# Patient Record
Sex: FEMALE | Race: WHITE | NOT HISPANIC OR LATINO | URBAN - METROPOLITAN AREA
[De-identification: names, ages, dates, MRNs, and addresses within clinical notes are randomized per-mention and may not be internally consistent; named-entity substitution may affect disease eponyms.]

---

## 2017-11-20 ENCOUNTER — ANESTHESIA EVENT (OUTPATIENT)
Dept: SURGERY | Facility: CLINIC | Age: 67
End: 2017-11-20
Payer: MEDICARE

## 2017-11-20 ENCOUNTER — HOSPITAL ENCOUNTER (OUTPATIENT)
Facility: CLINIC | Age: 67
Discharge: HOME | End: 2017-11-20
Attending: OPHTHALMOLOGY | Admitting: OPHTHALMOLOGY
Payer: MEDICARE

## 2017-11-20 ENCOUNTER — ANESTHESIA (OUTPATIENT)
Dept: SURGERY | Facility: CLINIC | Age: 67
End: 2017-11-20
Payer: MEDICARE

## 2017-11-20 VITALS
HEIGHT: 62 IN | TEMPERATURE: 98.7 F | OXYGEN SATURATION: 98 % | RESPIRATION RATE: 16 BRPM | DIASTOLIC BLOOD PRESSURE: 74 MMHG | SYSTOLIC BLOOD PRESSURE: 137 MMHG | WEIGHT: 190 LBS | BODY MASS INDEX: 34.96 KG/M2

## 2017-11-20 DIAGNOSIS — H25.019 CORTICAL AGE-RELATED CATARACT, UNSPECIFIED LATERALITY: Primary | ICD-10-CM

## 2017-11-20 PROCEDURE — 25000128 H RX IP 250 OP 636: Performed by: OPHTHALMOLOGY

## 2017-11-20 PROCEDURE — 71000028 ZZH EYE RECOVERY PHASE 2 EACH 15 MINS: Performed by: OPHTHALMOLOGY

## 2017-11-20 PROCEDURE — 37000008 ZZH ANESTHESIA TECHNICAL FEE, 1ST 30 MIN: Performed by: OPHTHALMOLOGY

## 2017-11-20 PROCEDURE — V2788 PRESBYOPIA-CORRECT FUNCTION: HCPCS | Performed by: OPHTHALMOLOGY

## 2017-11-20 PROCEDURE — 25000128 H RX IP 250 OP 636: Performed by: ANESTHESIOLOGY

## 2017-11-20 PROCEDURE — 25000128 H RX IP 250 OP 636: Performed by: NURSE ANESTHETIST, CERTIFIED REGISTERED

## 2017-11-20 PROCEDURE — 40000170 ZZH STATISTIC PRE-PROCEDURE ASSESSMENT II: Performed by: OPHTHALMOLOGY

## 2017-11-20 PROCEDURE — 36000101 ZZH EYE SURGERY LEVEL 3 1ST 30 MIN: Performed by: OPHTHALMOLOGY

## 2017-11-20 PROCEDURE — 25000125 ZZHC RX 250: Performed by: ANESTHESIOLOGY

## 2017-11-20 PROCEDURE — 25000125 ZZHC RX 250: Performed by: OPHTHALMOLOGY

## 2017-11-20 PROCEDURE — V2632 POST CHMBR INTRAOCULAR LENS: HCPCS | Performed by: OPHTHALMOLOGY

## 2017-11-20 PROCEDURE — 27210794 ZZH OR GENERAL SUPPLY STERILE: Performed by: OPHTHALMOLOGY

## 2017-11-20 DEVICE — IMPLANTABLE DEVICE: Type: IMPLANTABLE DEVICE | Site: EYE | Status: FUNCTIONAL

## 2017-11-20 RX ORDER — SODIUM CHLORIDE, SODIUM LACTATE, POTASSIUM CHLORIDE, CALCIUM CHLORIDE 600; 310; 30; 20 MG/100ML; MG/100ML; MG/100ML; MG/100ML
500 INJECTION, SOLUTION INTRAVENOUS CONTINUOUS
Status: DISCONTINUED | OUTPATIENT
Start: 2017-11-20 | End: 2017-11-20 | Stop reason: HOSPADM

## 2017-11-20 RX ORDER — MOXIFLOXACIN 5 MG/ML
1 SOLUTION/ DROPS OPHTHALMIC
Status: COMPLETED | OUTPATIENT
Start: 2017-11-20 | End: 2017-11-20

## 2017-11-20 RX ORDER — PHENYLEPHRINE HYDROCHLORIDE 25 MG/ML
1 SOLUTION/ DROPS OPHTHALMIC
Status: COMPLETED | OUTPATIENT
Start: 2017-11-20 | End: 2017-11-20

## 2017-11-20 RX ORDER — PROPARACAINE HYDROCHLORIDE 5 MG/ML
1 SOLUTION/ DROPS OPHTHALMIC ONCE
Status: COMPLETED | OUTPATIENT
Start: 2017-11-20 | End: 2017-11-20

## 2017-11-20 RX ORDER — TROPICAMIDE 10 MG/ML
1 SOLUTION/ DROPS OPHTHALMIC
Status: COMPLETED | OUTPATIENT
Start: 2017-11-20 | End: 2017-11-20

## 2017-11-20 RX ORDER — DICLOFENAC SODIUM 1 MG/ML
1 SOLUTION/ DROPS OPHTHALMIC
Status: COMPLETED | OUTPATIENT
Start: 2017-11-20 | End: 2017-11-20

## 2017-11-20 RX ORDER — LIDOCAINE HYDROCHLORIDE 10 MG/ML
INJECTION, SOLUTION EPIDURAL; INFILTRATION; INTRACAUDAL; PERINEURAL PRN
Status: DISCONTINUED | OUTPATIENT
Start: 2017-11-20 | End: 2017-11-20 | Stop reason: HOSPADM

## 2017-11-20 RX ORDER — BALANCED SALT SOLUTION 6.4; .75; .48; .3; 3.9; 1.7 MG/ML; MG/ML; MG/ML; MG/ML; MG/ML; MG/ML
SOLUTION OPHTHALMIC PRN
Status: DISCONTINUED | OUTPATIENT
Start: 2017-11-20 | End: 2017-11-20 | Stop reason: HOSPADM

## 2017-11-20 RX ADMIN — TROPICAMIDE 1 DROP: 10 SOLUTION/ DROPS OPHTHALMIC at 09:44

## 2017-11-20 RX ADMIN — TROPICAMIDE 1 DROP: 10 SOLUTION/ DROPS OPHTHALMIC at 09:37

## 2017-11-20 RX ADMIN — DICLOFENAC SODIUM 1 DROP: 1 SOLUTION OPHTHALMIC at 09:44

## 2017-11-20 RX ADMIN — MOXIFLOXACIN 1 DROP: 5 SOLUTION/ DROPS OPHTHALMIC at 09:37

## 2017-11-20 RX ADMIN — DICLOFENAC SODIUM 1 DROP: 1 SOLUTION OPHTHALMIC at 09:38

## 2017-11-20 RX ADMIN — Medication 1 DROP: at 09:46

## 2017-11-20 RX ADMIN — TROPICAMIDE 1 DROP: 10 SOLUTION/ DROPS OPHTHALMIC at 09:29

## 2017-11-20 RX ADMIN — PHENYLEPHRINE HYDROCHLORIDE 1 DROP: 2.5 SOLUTION/ DROPS OPHTHALMIC at 09:44

## 2017-11-20 RX ADMIN — DICLOFENAC SODIUM 1 DROP: 1 SOLUTION OPHTHALMIC at 09:29

## 2017-11-20 RX ADMIN — SODIUM CHLORIDE, POTASSIUM CHLORIDE, SODIUM LACTATE AND CALCIUM CHLORIDE 500 ML: 600; 310; 30; 20 INJECTION, SOLUTION INTRAVENOUS at 09:48

## 2017-11-20 RX ADMIN — MOXIFLOXACIN 1 DROP: 5 SOLUTION/ DROPS OPHTHALMIC at 09:29

## 2017-11-20 RX ADMIN — MOXIFLOXACIN 1 DROP: 5 SOLUTION/ DROPS OPHTHALMIC at 09:44

## 2017-11-20 RX ADMIN — LIDOCAINE HYDROCHLORIDE 1 ML: 10 INJECTION, SOLUTION EPIDURAL; INFILTRATION; INTRACAUDAL; PERINEURAL at 09:46

## 2017-11-20 RX ADMIN — PROPARACAINE HYDROCHLORIDE 1 DROP: 5 SOLUTION/ DROPS OPHTHALMIC at 09:28

## 2017-11-20 RX ADMIN — MIDAZOLAM HYDROCHLORIDE 0.5 MG: 1 INJECTION, SOLUTION INTRAMUSCULAR; INTRAVENOUS at 10:28

## 2017-11-20 RX ADMIN — PHENYLEPHRINE HYDROCHLORIDE 1 DROP: 2.5 SOLUTION/ DROPS OPHTHALMIC at 09:37

## 2017-11-20 RX ADMIN — PHENYLEPHRINE HYDROCHLORIDE 1 DROP: 2.5 SOLUTION/ DROPS OPHTHALMIC at 09:29

## 2017-11-20 ASSESSMENT — ENCOUNTER SYMPTOMS
DYSRHYTHMIAS: 0
ORTHOPNEA: 0
SEIZURES: 0

## 2017-11-20 ASSESSMENT — COPD QUESTIONNAIRES: COPD: 0

## 2017-11-20 ASSESSMENT — LIFESTYLE VARIABLES: TOBACCO_USE: 0

## 2017-11-20 NOTE — ANESTHESIA PREPROCEDURE EVALUATION
Procedure: Procedure(s):  PHACOEMULSIFICATION CLEAR CORNEA WITH DELUXE INTRAOCULAR LENS IMPLANT  INCISION LIMBAL RELAXING, KERATOTOMY ARCUATE  Preop diagnosis: CATARACT    Allergies   Allergen Reactions     Codeine      Hmg-Coa-R Inhibitors      History reviewed. No pertinent past medical history.  Past Surgical History:   Procedure Laterality Date     ABDOMEN SURGERY       BIOPSY       COLONOSCOPY       HYSTERECTOMY       Social History   Substance Use Topics     Smoking status: Never Smoker     Smokeless tobacco: Never Used     Alcohol use Yes      Comment: social     Prior to Admission medications    Medication Sig Start Date End Date Taking? Authorizing Provider   Calcium Carbonate-Vit D-Min (CALCIUM 1200 PO)    Yes Reported, Patient   VITAMIN D, CHOLECALCIFEROL, PO Take 2,000 Units by mouth daily   Yes Reported, Patient     Current Facility-Administered Medications Ordered in Epic   Medication Dose Route Frequency Last Rate Last Dose     lidocaine 1 % 1 mL  1 mL Other Q1H PRN   1 mL at 11/20/17 0946     lactated ringers infusion  500 mL Intravenous Continuous 25 mL/hr at 11/20/17 0948 500 mL at 11/20/17 0948     No current [facility]-ordered outpatient prescriptions on file.       lactated ringers 500 mL (11/20/17 0948)     Wt Readings from Last 1 Encounters:   11/20/17 86.2 kg (190 lb)     Temp Readings from Last 1 Encounters:   11/20/17 37.1  C (98.7  F) (Temporal)     BP Readings from Last 6 Encounters:   11/20/17 132/74     Pulse Readings from Last 4 Encounters:   No data found for Pulse     Resp Readings from Last 1 Encounters:   11/20/17 16     SpO2 Readings from Last 1 Encounters:   11/20/17 99%     RECENT LABS:   ECG:   ECHO:     Anesthesia Evaluation     . Pt has had prior anesthetic. Type: General    No history of anesthetic complications (very sensitive to anesthetics)          ROS/MED HX    ENT/Pulmonary:      (-) tobacco use, asthma, COPD, sleep apnea and recent URI   Neurologic:      (-) seizures and  CVA   Cardiovascular:        (-) angina, hypertension, CAD, orthopnea/PND, syncope, arrhythmias, irregular heartbeat/palpitations, valvular problems/murmurs and angina   METS/Exercise Tolerance:  >4 METS   Hematologic:         Musculoskeletal:         GI/Hepatic:        (-) GERD and liver disease   Renal/Genitourinary:      (-) renal disease   Endo:      (-) Type II DM and thyroid disease   Psychiatric:         Infectious Disease:         Malignancy:         Other:                     Physical Exam      Airway   Mallampati: II  TM distance: >3 FB  Neck ROM: full    Dental   (+) caps    Cardiovascular   Rhythm and rate: regular and normal  (-) no murmur    Pulmonary    breath sounds clear to auscultation(-) no wheezes                    Anesthesia Plan      History & Physical Review  History and physical reviewed and following examination; no interval change.    ASA Status:  1 .    NPO Status:  > 8 hours    Plan for MAC Reason for MAC:  Procedure to face, neck, head or breast  PONV prophylaxis:  Ondansetron (or other 5HT-3)  Light sedation, slow titration       Postoperative Care  Postoperative pain management:  Multi-modal analgesia.      Consents  Anesthetic plan, risks, benefits and alternatives discussed with:  Patient..

## 2017-11-20 NOTE — DISCHARGE INSTRUCTIONS
[facility] Anesthesia [facility] Discharge  Instructions  Anesthesia (Eye Care Center)   Adult Discharge Instructions    For 24 hours after surgery    1. Get plenty of rest.  Make arrangements to have a responsible adult stay with you for at least 6 hours after you leave the hospital.  2. Do not drive or use heavy equipment for 24 hours.    3. Do not drink alcohol for 24 hours.  4. Do not sign legal documents or make important decisions for 24 hours.  5. Avoid strenuous or risky activities. You may feel lightheaded.  If so, sit for a few minutes before standing.  Have someone help you get up.   6. Conscious sedation patients may resume a regular diet..  7. Any questions of medical nature, call your physician.    Dr. Mervin Moyer  [facility]  [phone]  Post Operative Cataract Instructions        You may remove the eye shield on arrival home and begin eye drops as directed when you get home.      Wear eye shield when sleeping for protection for 5 days.      Do not rub the operated eye.      Light sensitivity may be noticed. Sunglasses may be worn for comfort.      Some discomfort and irritation may be noticed. Acetaminophen (Tylenol) or Ibuprofen (Advil) may be taken for discomfort. If pain persists please call Dr. Moyer's office.      Keep the operated eye dry. You may wash your hair, bathe or shower, but keep the operated eye closed while doing so.       Use medication exactly as prescribed by your doctor. You may restart your regular home medications.        No eye makeup on or around the eye for 1 week.      Bring any glaucoma medications with you to the clinic on your first post operative visit.      Call Dr. Moyer's office if any of the following should occur:    o Any sudden vision changes  o Nausea or severe headache  o Increase in pain not controlled  o Or signs of infection (pus, increasing redness or tenderness)

## 2017-11-20 NOTE — ANESTHESIA POSTPROCEDURE EVALUATION
Patient: Oliva Nj    Procedure(s):  LEFT EYE PHACOEMULSIFICATION CLEAR CORNEA WITH MULTI FOCAL TECHNIS DELUXE INTRAOCULAR LENS IMPLANT AND LIMBAL  RELAXING INCISION - Wound Class: I-Clean      Diagnosis:CATARACT  Diagnosis Additional Information: No value filed.    Anesthesia Type:  MAC    Note:  Anesthesia Post Evaluation    Patient location during evaluation: PACU  Patient participation: Able to fully participate in evaluation  Level of consciousness: awake  Pain management: adequate  Airway patency: patent  Cardiovascular status: acceptable  Respiratory status: acceptable  Hydration status: acceptable  PONV: none     Anesthetic complications: None          Last vitals:  Vitals:    11/20/17 0933 11/20/17 1058 11/20/17 1104   BP: 132/74 129/70 137/74   Resp: 16 16 16   Temp: 37.1  C (98.7  F)     SpO2: 99% 97% 98%         Electronically Signed By: Jean Cartagena MD  November 20, 2017  12:36 PM

## 2017-11-20 NOTE — IP AVS SNAPSHOT
MRN:[id number]                      After Visit Summary   11/20/2017    Oliva Nj    MRN: [id number]           Thank you!     Thank you for choosing [facility] for your care. Our goal is always to provide you with excellent care. Hearing back from our patients is one way we can continue to improve our services. Please take a few minutes to complete the written survey that you may receive in the mail after you visit with us. Thank you!        Patient Information     Date Of Birth          1950        About your hospital stay     You were admitted on:  November 20, 2017 You last received care in the:  [facility]    You were discharged on:  November 20, 2017       Who to Call     For medical emergencies, please call 911.  For non-urgent questions about your medical care, please call your primary care provider or clinic, None  For questions related to your surgery, please call your surgery clinic        Attending Provider     Provider Specialty    Mervin Moyer MD Ophthalmology       Primary Care Provider    Ashia Peralta      Your next 10 appointments already scheduled     Dec 11, 2017   Procedure with Mervin Moyer MD   [facility] PeriOP Services (--)    [address]  [facility] [address] [phone]              Further instructions from your care team       [facility] Anesthesia Eye Care Center Discharge  Instructions  Anesthesia ([facility] [place])   Adult Discharge Instructions    For 24 hours after surgery    1. Get plenty of rest.  Make arrangements to have a responsible adult stay with you for at least 6 hours after you leave the hospital.  2. Do not drive or use heavy equipment for 24 hours.    3. Do not drink alcohol for 24 hours.  4. Do not sign legal documents or make important decisions for 24 hours.  5. Avoid strenuous or risky activities. You may feel lightheaded.  If so, sit for a few minutes before standing.   "Have someone help you get up.   6. Conscious sedation patients may resume a regular diet..  7. Any questions of medical nature, call your physician.    Dr. Mervin Moyer  [facility]  [phone]  Post Operative Cataract Instructions        You may remove the eye shield on arrival home and begin eye drops as directed when you get home.      Wear eye shield when sleeping for protection for 5 days.      Do not rub the operated eye.      Light sensitivity may be noticed. Sunglasses may be worn for comfort.      Some discomfort and irritation may be noticed. Acetaminophen (Tylenol) or Ibuprofen (Advil) may be taken for discomfort. If pain persists please call Dr. Moyer's office.      Keep the operated eye dry. You may wash your hair, bathe or shower, but keep the operated eye closed while doing so.       Use medication exactly as prescribed by your doctor. You may restart your regular home medications.        No eye makeup on or around the eye for 1 week.      Bring any glaucoma medications with you to the clinic on your first post operative visit.      Call Dr. Moyer's office if any of the following should occur:    o Any sudden vision changes  o Nausea or severe headache  o Increase in pain not controlled  o Or signs of infection (pus, increasing redness or tenderness)    Pending Results     No orders found from 11/18/2017 to 11/21/2017.            Admission Information     Date & Time Provider Department Dept. Phone    11/20/2017 Mervin Moyer MD [facility] [phone]      Your Vitals Were     Blood Pressure Temperature Respirations Height Weight Pulse Oximetry    129/70 98.7  F (37.1  C) (Temporal) 16 1.575 m (5' 2\") 86.2 kg (190 lb) 97%    BMI (Body Mass Index)                   34.75 kg/m2           MyChart Information     The [employer] lets you send messages to your doctor, view your test results, renew your prescriptions, schedule appointments and more. To sign up, go to " "[email or web address] . Click on \"Log in\" on the left side of the screen, which will take you to the Welcome page. Then click on \"Sign up Now\" on the right side of the page.     You will be asked to enter the access code listed below, as well as some personal information. Please follow the directions to create your username and password.     Your access code is: [email or web address]  Expires: 2018 11:01 AM     Your access code will  in 90 days. If you need help or a new code, please call your [place] clinic or [phone].        Care EveryWhere ID     This is your Care EveryWhere ID. This could be used by other organizations to access your [place] medical records  [id number]        Equal Access to Services     TITO JARAMILLO : Odalys Tomas, randall goins, lily khan, mulugeta mendoza . So [facility] [phone].    ATENCIÓN: Si habla español, tiene a phan disposición servicios gratuitos de asistencia lingüística. Sarah al [phone].    We comply with applicable federal civil rights laws and [place] laws. We do not discriminate on the basis of race, color, national origin, age, disability, sex, sexual orientation, or gender identity.               Review of your medicines      UNREVIEWED medicines. Ask your doctor about these medicines        Dose / Directions    CALCIUM 1200 PO        Refills:  0       VITAMIN D (CHOLECALCIFEROL) PO        Dose:  2000 Units   Take 2,000 Units by mouth daily   Refills:  0                Protect others around you: Learn how to safely use, store and throw away your medicines at www.disposemymeds.org.             Medication List: This is a list of all your medications and when to take them. Check marks below indicate your daily home schedule. Keep this list as a reference.      Medications           Morning Afternoon Evening Bedtime As Needed    CALCIUM 1200 PO                                VITAMIN D (CHOLECALCIFEROL) PO "   Take 2,000 Units by mouth daily

## 2017-11-20 NOTE — IP AVS SNAPSHOT
[facility]    [address]    [place] [address]    Phone:  [phone]    Fax:  [phone]                                       After Visit Summary   11/20/2017    Oliva Nj    MRN: [id number]           After Visit Summary Signature Page     I have received my discharge instructions, and my questions have been answered. I have discussed any challenges I see with this plan with the nurse or doctor.    ..........................................................................................................................................  Patient/Patient Representative Signature      ..........................................................................................................................................  Patient Representative Print Name and Relationship to Patient    ..................................................               ................................................  Date                                            Time    ..........................................................................................................................................  Reviewed by Signature/Title    ...................................................              ..............................................  Date                                                            Time

## 2017-11-20 NOTE — ANESTHESIA CARE TRANSFER NOTE
Patient: Oliva Nj    Procedure(s):  LEFT EYE PHACOEMULSIFICATION CLEAR CORNEA WITH MULTI FOCAL TECHNIS DELUXE INTRAOCULAR LENS IMPLANT AND LIMBAL  RELAXING INCISION - Wound Class: I-Clean      Diagnosis: CATARACT  Diagnosis Additional Information: No value filed.    Anesthesia Type:   MAC     Note:  Airway :Room Air  Patient transferred to:PACU  Handoff Report: Identifed the Patient, Identified the Reponsible Provider, Reviewed the pertinent medical history, Discussed the surgical course, Reviewed Intra-OP anesthesia mangement and issues during anesthesia, Set expectations for post-procedure period and Allowed opportunity for questions and acknowledgement of understanding      Vitals: (Last set prior to Anesthesia Care Transfer)    CRNA VITALS  11/20/2017 1026 - 11/20/2017 1057      11/20/2017             Resp Rate (set): 10                Electronically Signed By: RAZIA Santana CRNA  November 20, 2017  10:57 AM

## 2017-11-26 NOTE — OP NOTE
PREOPERATIVE DIAGNOSIS: Visually significant cataract, Left  Left eye   POSTOPERATIVE DIAGNOSIS: Same   PROCEDURES:   1. Cataract extraction with intraocular lens implant Left  Left eye.  SURGEON: Mervin Moyer MD   INDICATIONS: The patient Oliva Nj presented to the eye clinic with decreased vision secondary to cataract in the Left  Left eye. The risks, benefits and alternatives to cataract extraction were discussed. The patient elected to proceed. All questions were answered to the patient's satisfaction.   DESCRIPTION OF PROCEDURE: The patient was brought to the operating suite and the Left  Left eye was prepped and draped in a sterile fashion.  A paracentesis was created using the Supersharp blade.  1% non-preserved Lidocaine was then injected into the anterior chamber, followed by viscoelastic. A temporal clear corneal wound was created using the keratome. The cystotome was introduced and an anterior capsulorrhexis begun which was completed using the Utrata forceps. Hydrodissection was performed, and the nucleus was rotated. The phacoemulsification handpiece was introduced, and the nucleus was grooved and subsequently cracked. The nucleus was rotated and chopped into smaller segments using the Marcia chopper. These segments were emulsified and aspirated from the eye. The remaining lens cortex was removed using irrigation-aspiration. The capsular bag was filled with viscoelastic, and an intraocular lens was injected into the capsular bag (see below). Remaining viscoelastic was removed using irrigation-aspiration. The anterior chamber was instilled with balanced salt solution and found to be watertight.  0.1cc Cefuroxime was then injected via the paracentesis.  Betadine followed by Durezol drops were applied to the ocular surface which was then covered with a shield. The patient was transferred to the Post Anesthesia Care Unit in satisfactory condition.   PLAN: The patient will be discharged to  home and will follow up tomorrow morning in the eye clinic.  EBL:  None  Complications:  None  Specimens:  None  Findings:  Cataract    Implant Name Type Inv. Item Serial No.  Lot No. LRB No. Used   EYE IMP IOL MARINA PCL TECNIS SYMFONY XR [id number] 27.0 Lens/Eye Implant EYE IMP IOL MARINA PCL TECNIS SYMFONY XR [id number] 27.0 [id number] [employer]   Left 1     CC:  Mervin Moyer MD - [facility]

## 2017-12-11 ENCOUNTER — HOSPITAL ENCOUNTER (OUTPATIENT)
Facility: CLINIC | Age: 67
Discharge: HOME | End: 2017-12-11
Attending: OPHTHALMOLOGY | Admitting: OPHTHALMOLOGY
Payer: MEDICARE

## 2017-12-11 ENCOUNTER — ANESTHESIA EVENT (OUTPATIENT)
Dept: SURGERY | Facility: CLINIC | Age: 67
End: 2017-12-11
Payer: MEDICARE

## 2017-12-11 ENCOUNTER — ANESTHESIA (OUTPATIENT)
Dept: SURGERY | Facility: CLINIC | Age: 67
End: 2017-12-11
Payer: MEDICARE

## 2017-12-11 VITALS
TEMPERATURE: 97.5 F | DIASTOLIC BLOOD PRESSURE: 67 MMHG | RESPIRATION RATE: 16 BRPM | OXYGEN SATURATION: 94 % | BODY MASS INDEX: 34.97 KG/M2 | WEIGHT: 190.04 LBS | HEIGHT: 62 IN | SYSTOLIC BLOOD PRESSURE: 115 MMHG

## 2017-12-11 DIAGNOSIS — H25.019 CORTICAL AGE-RELATED CATARACT, UNSPECIFIED LATERALITY: Primary | ICD-10-CM

## 2017-12-11 PROCEDURE — 25000128 H RX IP 250 OP 636: Performed by: NURSE ANESTHETIST, CERTIFIED REGISTERED

## 2017-12-11 PROCEDURE — 25000125 ZZHC RX 250: Performed by: OPHTHALMOLOGY

## 2017-12-11 PROCEDURE — 37000009 ZZH ANESTHESIA TECHNICAL FEE, EACH ADDTL 15 MIN: Performed by: OPHTHALMOLOGY

## 2017-12-11 PROCEDURE — 36000101 ZZH EYE SURGERY LEVEL 3 1ST 30 MIN: Performed by: OPHTHALMOLOGY

## 2017-12-11 PROCEDURE — 27210794 ZZH OR GENERAL SUPPLY STERILE: Performed by: OPHTHALMOLOGY

## 2017-12-11 PROCEDURE — 25000125 ZZHC RX 250: Performed by: ANESTHESIOLOGY

## 2017-12-11 PROCEDURE — 25000125 ZZHC RX 250: Performed by: NURSE ANESTHETIST, CERTIFIED REGISTERED

## 2017-12-11 PROCEDURE — 25000128 H RX IP 250 OP 636: Performed by: ANESTHESIOLOGY

## 2017-12-11 PROCEDURE — 37000008 ZZH ANESTHESIA TECHNICAL FEE, 1ST 30 MIN: Performed by: OPHTHALMOLOGY

## 2017-12-11 PROCEDURE — 40000170 ZZH STATISTIC PRE-PROCEDURE ASSESSMENT II: Performed by: OPHTHALMOLOGY

## 2017-12-11 PROCEDURE — 25000128 H RX IP 250 OP 636: Performed by: OPHTHALMOLOGY

## 2017-12-11 PROCEDURE — V2788 PRESBYOPIA-CORRECT FUNCTION: HCPCS | Performed by: OPHTHALMOLOGY

## 2017-12-11 PROCEDURE — V2632 POST CHMBR INTRAOCULAR LENS: HCPCS | Performed by: OPHTHALMOLOGY

## 2017-12-11 PROCEDURE — 71000028 ZZH EYE RECOVERY PHASE 2 EACH 15 MINS: Performed by: OPHTHALMOLOGY

## 2017-12-11 DEVICE — IMPLANTABLE DEVICE: Type: IMPLANTABLE DEVICE | Site: EYE | Status: FUNCTIONAL

## 2017-12-11 RX ORDER — DICLOFENAC SODIUM 1 MG/ML
1 SOLUTION/ DROPS OPHTHALMIC
Status: COMPLETED | OUTPATIENT
Start: 2017-12-11 | End: 2017-12-11

## 2017-12-11 RX ORDER — TROPICAMIDE 10 MG/ML
1 SOLUTION/ DROPS OPHTHALMIC
Status: COMPLETED | OUTPATIENT
Start: 2017-12-11 | End: 2017-12-11

## 2017-12-11 RX ORDER — PROPARACAINE HYDROCHLORIDE 5 MG/ML
1 SOLUTION/ DROPS OPHTHALMIC ONCE
Status: COMPLETED | OUTPATIENT
Start: 2017-12-11 | End: 2017-12-11

## 2017-12-11 RX ORDER — LIDOCAINE HYDROCHLORIDE 10 MG/ML
INJECTION, SOLUTION EPIDURAL; INFILTRATION; INTRACAUDAL; PERINEURAL PRN
Status: DISCONTINUED | OUTPATIENT
Start: 2017-12-11 | End: 2017-12-11 | Stop reason: HOSPADM

## 2017-12-11 RX ORDER — MOXIFLOXACIN 5 MG/ML
1 SOLUTION/ DROPS OPHTHALMIC
Status: COMPLETED | OUTPATIENT
Start: 2017-12-11 | End: 2017-12-11

## 2017-12-11 RX ORDER — BALANCED SALT SOLUTION 6.4; .75; .48; .3; 3.9; 1.7 MG/ML; MG/ML; MG/ML; MG/ML; MG/ML; MG/ML
SOLUTION OPHTHALMIC PRN
Status: DISCONTINUED | OUTPATIENT
Start: 2017-12-11 | End: 2017-12-11 | Stop reason: HOSPADM

## 2017-12-11 RX ORDER — SODIUM CHLORIDE, SODIUM LACTATE, POTASSIUM CHLORIDE, CALCIUM CHLORIDE 600; 310; 30; 20 MG/100ML; MG/100ML; MG/100ML; MG/100ML
500 INJECTION, SOLUTION INTRAVENOUS CONTINUOUS
Status: DISCONTINUED | OUTPATIENT
Start: 2017-12-11 | End: 2017-12-11 | Stop reason: HOSPADM

## 2017-12-11 RX ORDER — PHENYLEPHRINE HYDROCHLORIDE 25 MG/ML
1 SOLUTION/ DROPS OPHTHALMIC
Status: COMPLETED | OUTPATIENT
Start: 2017-12-11 | End: 2017-12-11

## 2017-12-11 RX ADMIN — DICLOFENAC SODIUM 1 DROP: 1 SOLUTION OPHTHALMIC at 06:53

## 2017-12-11 RX ADMIN — MOXIFLOXACIN 1 DROP: 5 SOLUTION/ DROPS OPHTHALMIC at 06:53

## 2017-12-11 RX ADMIN — SODIUM CHLORIDE, SODIUM LACTATE, POTASSIUM CHLORIDE, CALCIUM CHLORIDE 500 ML: 600; 310; 30; 20 INJECTION, SOLUTION INTRAVENOUS at 07:09

## 2017-12-11 RX ADMIN — PHENYLEPHRINE HYDROCHLORIDE 1 DROP: 2.5 SOLUTION/ DROPS OPHTHALMIC at 06:53

## 2017-12-11 RX ADMIN — LIDOCAINE HYDROCHLORIDE 1 ML: 10 INJECTION, SOLUTION EPIDURAL; INFILTRATION; INTRACAUDAL; PERINEURAL at 07:08

## 2017-12-11 RX ADMIN — TROPICAMIDE 1 DROP: 10 SOLUTION/ DROPS OPHTHALMIC at 07:02

## 2017-12-11 RX ADMIN — DICLOFENAC SODIUM 1 DROP: 1 SOLUTION OPHTHALMIC at 07:02

## 2017-12-11 RX ADMIN — DEXMEDETOMIDINE HYDROCHLORIDE 8 MCG: 100 INJECTION, SOLUTION INTRAVENOUS at 08:13

## 2017-12-11 RX ADMIN — MOXIFLOXACIN 1 DROP: 5 SOLUTION/ DROPS OPHTHALMIC at 06:44

## 2017-12-11 RX ADMIN — PHENYLEPHRINE HYDROCHLORIDE 1 DROP: 2.5 SOLUTION/ DROPS OPHTHALMIC at 06:44

## 2017-12-11 RX ADMIN — MOXIFLOXACIN 1 DROP: 5 SOLUTION/ DROPS OPHTHALMIC at 07:02

## 2017-12-11 RX ADMIN — Medication 1 DROP: at 06:44

## 2017-12-11 RX ADMIN — DICLOFENAC SODIUM 1 DROP: 1 SOLUTION OPHTHALMIC at 06:44

## 2017-12-11 RX ADMIN — PROPARACAINE HYDROCHLORIDE 1 DROP: 5 SOLUTION/ DROPS OPHTHALMIC at 06:44

## 2017-12-11 RX ADMIN — PHENYLEPHRINE HYDROCHLORIDE 1 DROP: 2.5 SOLUTION/ DROPS OPHTHALMIC at 07:02

## 2017-12-11 RX ADMIN — TROPICAMIDE 1 DROP: 10 SOLUTION/ DROPS OPHTHALMIC at 06:44

## 2017-12-11 RX ADMIN — TROPICAMIDE 1 DROP: 10 SOLUTION/ DROPS OPHTHALMIC at 06:53

## 2017-12-11 NOTE — IP AVS SNAPSHOT
MRN:[id number]                      After Visit Summary   12/11/2017    Oliva Nj    MRN: [id number]           Thank you!     Thank you for choosing [facility] for your care. Our goal is always to provide you with excellent care. Hearing back from our patients is one way we can continue to improve our services. Please take a few minutes to complete the written survey that you may receive in the mail after you visit with us. Thank you!        Patient Information     Date Of Birth          1950        About your hospital stay     You were admitted on:  December 11, 2017 You last received care in the:  [facility]    You were discharged on:  December 11, 2017       Who to Call     For medical emergencies, please call 911.  For non-urgent questions about your medical care, please call your primary care provider or clinic, None  For questions related to your surgery, please call your surgery clinic        Attending Provider     Provider Mervin Escobar MD Ophthalmology       Primary Care Provider    Ashia Peralta      Further instructions from your care team       [facility] Anesthesia [facility] [place] Discharge  Instructions  Anesthesia ([facility] [place])   Adult Discharge Instructions    For 24 hours after surgery    1. Get plenty of rest.  Make arrangements to have a responsible adult stay with you for at least 6 hours after you leave the hospital.  2. Do not drive or use heavy equipment for 24 hours.    3. Do not drink alcohol for 24 hours.  4. Do not sign legal documents or make important decisions for 24 hours.  5. Avoid strenuous or risky activities. You may feel lightheaded.  If so, sit for a few minutes before standing.  Have someone help you get up.   6. Conscious sedation patients may resume a regular diet..  Any questions of medical nature, call your physician.Dr. Mervin Moyer  [facility]  [phone]  Post Operative Cataract  "Instructions      You may remove the eye shield on arrival home and begin eye drops as directed when you get home.    Wear eye shield when sleeping for protection for 5 days.    Do not rub the operated eye.    Light sensitivity may be noticed. Sunglasses may be worn for comfort.    Some discomfort and irritation may be noticed. Acetaminophen (Tylenol) or Ibuprofen (Advil) may be taken for discomfort. If pain persists please call Dr. Moyer's office.    Keep the operated eye dry. You may wash your hair, bathe or shower, but keep the operated eye closed while doing so.     Use medication exactly as prescribed by your doctor. You may restart your regular home medications.    If your procedure included an ECP (Endoscopic Cyclophotocoagulation), you should take Diamox 500 mg at bedtime as directed by your physician.     No eye makeup on or around the eye for 1 week.    Bring any glaucoma medications with you to the clinic on your first post operative visit.    Call Dr. Moyer's office if any of the following should occur:    Any sudden vision changes  Nausea or severe headache  Increase in pain not controlled  7.   8. Or signs of infection (pus, increasing redness or tenderness)---------------------eye med  APG I drop 3X a Day for 3 weeks    Pending Results     No orders found from 12/9/2017 to 12/12/2017.            Admission Information     Date & Time Provider Department Dept. Phone    12/11/2017 Mervin Moyer MD [facility] [phone]      Your Vitals Were     Blood Pressure Temperature Respirations Height Weight Pulse Oximetry    115/67 97.5  F (36.4  C) (Temporal) 16 1.575 m (5' 2\") 86.2 kg (190 lb 0.6 oz) 94%    BMI (Body Mass Index)                   34.76 kg/m2           [employer] Information     [employer] lets you send messages to your doctor, view your test results, renew your prescriptions, schedule appointments and more. To sign up, go to [email or web address] . Click on \"Log in\" on " "the left side of the screen, which will take you to the Welcome page. Then click on \"Sign up Now\" on the right side of the page.     You will be asked to enter the access code listed below, as well as some personal information. Please follow the directions to create your username and password.     Your access code is: [email or web address]  Expires: 2018 11:01 AM     Your access code will  in 90 days. If you need help or a new code, please call your [place] clinic or [phone].        Care EveryWhere ID     This is your Care EveryWhere ID. This could be used by other organizations to access your [place] medical records  [id number]        Equal Access to Services     TITO JARAMILLO : Odalys Tomas, randall goins, lily khan, mulugeta bagley. So [facility] [phone].    ATENCIÓN: Si habla español, tiene a phan disposición servicios gratuitos de asistencia lingüística. Llame al [phone].    We comply with applicable federal civil rights laws and [place] laws. We do not discriminate on the basis of race, color, national origin, age, disability, sex, sexual orientation, or gender identity.               Review of your medicines      UNREVIEWED medicines. Ask your doctor about these medicines        Dose / Directions    CALCIUM 1200 PO        Refills:  0       VITAMIN D (CHOLECALCIFEROL) PO        Dose:  2000 Units   Take 2,000 Units by mouth daily   Refills:  0                Protect others around you: Learn how to safely use, store and throw away your medicines at www.disposemymeds.org.             Medication List: This is a list of all your medications and when to take them. Check marks below indicate your daily home schedule. Keep this list as a reference.      Medications           Morning Afternoon Evening Bedtime As Needed    CALCIUM 1200 PO                                VITAMIN D (CHOLECALCIFEROL) PO   Take 2,000 Units by mouth daily              "

## 2017-12-11 NOTE — DISCHARGE INSTRUCTIONS
[facility] Discharge  Instructions  Anesthesia ([facility])   Adult Discharge Instructions    For 24 hours after surgery    1. Get plenty of rest.  Make arrangements to have a responsible adult stay with you for at least 6 hours after you leave the hospital.  2. Do not drive or use heavy equipment for 24 hours.    3. Do not drink alcohol for 24 hours.  4. Do not sign legal documents or make important decisions for 24 hours.  5. Avoid strenuous or risky activities. You may feel lightheaded.  If so, sit for a few minutes before standing.  Have someone help you get up.   6. Conscious sedation patients may resume a regular diet..  Any questions of medical nature, call your physician.Dr. Mervin Moyer  [facility]  [phone]  Post Operative Cataract Instructions      You may remove the eye shield on arrival home and begin eye drops as directed when you get home.    Wear eye shield when sleeping for protection for 5 days.    Do not rub the operated eye.    Light sensitivity may be noticed. Sunglasses may be worn for comfort.    Some discomfort and irritation may be noticed. Acetaminophen (Tylenol) or Ibuprofen (Advil) may be taken for discomfort. If pain persists please call Dr. Moyer's office.    Keep the operated eye dry. You may wash your hair, bathe or shower, but keep the operated eye closed while doing so.     Use medication exactly as prescribed by your doctor. You may restart your regular home medications.    If your procedure included an ECP (Endoscopic Cyclophotocoagulation), you should take Diamox 500 mg at bedtime as directed by your physician.     No eye makeup on or around the eye for 1 week.    Bring any glaucoma medications with you to the clinic on your first post operative visit.    Call Dr. Moyer's office if any of the following should occur:    Any sudden vision changes  Nausea or severe headache  Increase in pain not controlled  7.   8. Or signs of  infection (pus, increasing redness or tenderness)---------------------eye med  APG I drop 3X a Day for 3 weeks

## 2017-12-11 NOTE — ANESTHESIA CARE TRANSFER NOTE
Patient: Oliva Nj    Procedure(s):  RIGHT  EYE PHACOEMULSIFICATION CLEAR CORNEA WITH MULTI FOCAL TECHNIS DELUXE INTRAOCULAR LENS IMPLANT  - Wound Class: I-Clean    Diagnosis: CATARACT  Diagnosis Additional Information: No value filed.    Anesthesia Type:   MAC     Note:    Patient transferred to:Phase II  Comments: To recovery room, awake, comfortable, and stable.Handoff Report: Identifed the Patient, Identified the Reponsible Provider, Reviewed the pertinent medical history, Discussed the surgical course, Reviewed Intra-OP anesthesia mangement and issues during anesthesia, Set expectations for post-procedure period and Allowed opportunity for questions and acknowledgement of understanding      Vitals: (Last set prior to Anesthesia Care Transfer)    CRNA VITALS  12/11/2017 0807 - 12/11/2017 0839      12/11/2017             Pulse: 52    SpO2: 95 %    Resp Rate (set): 10                Electronically Signed By: RAZIA Adamson CRNA  December 11, 2017  8:39 AM

## 2017-12-11 NOTE — ANESTHESIA POSTPROCEDURE EVALUATION
Patient: Oliva Nj    Procedure(s):  RIGHT  EYE PHACOEMULSIFICATION CLEAR CORNEA WITH MULTI FOCAL TECHNIS DELUXE INTRAOCULAR LENS IMPLANT  - Wound Class: I-Clean    Diagnosis:CATARACT  Diagnosis Additional Information: No value filed.    Anesthesia Type:  MAC    Note:  Anesthesia Post Evaluation    Patient location during evaluation: PACU  Patient participation: Able to fully participate in evaluation  Level of consciousness: awake  Pain management: adequate  Airway patency: patent  Cardiovascular status: acceptable  Respiratory status: acceptable  Hydration status: acceptable  PONV: none     Anesthetic complications: None          Last vitals:  Vitals:    12/11/17 0649 12/11/17 0800 12/11/17 0904   BP: 123/70 114/65 115/67   Resp: 16 16 16   Temp: 36.4  C (97.5  F)     SpO2: 95% 93% 94%         Electronically Signed By: Lita Clark  December 11, 2017  10:01 AM

## 2018-01-29 NOTE — OP NOTE
PREOPERATIVE DIAGNOSIS: Visually significant cataract, Right eye   POSTOPERATIVE DIAGNOSIS: Same   PROCEDURES:   1. Cataract extraction with intraocular lens implant Right eye.  SURGEON: Mervin Moyer MD   INDICATIONS: The patient Oliva Nj presented to the eye clinic with decreased vision secondary to cataract in the Right eye. The risks, benefits and alternatives to cataract extraction were discussed. The patient elected to proceed. All questions were answered to the patient's satisfaction.   DESCRIPTION OF PROCEDURE: The patient was brought to the operating suite and the Right eye was prepped and draped in a sterile fashion.  A paracentesis was created using the Supersharp blade.  1% non-preserved Lidocaine was then injected into the anterior chamber, followed by viscoelastic. A temporal clear corneal wound was created using the keratome. The cystotome was introduced and an anterior capsulorrhexis begun which was completed using the Utrata forceps. Hydrodissection was performed, and the nucleus was rotated. The phacoemulsification handpiece was introduced, and the nucleus was grooved and subsequently cracked. The nucleus was rotated and chopped into smaller segments using the Marcia chopper. These segments were emulsified and aspirated from the eye. The remaining lens cortex was removed using irrigation-aspiration. The capsular bag was filled with viscoelastic, and an intraocular lens was injected into the capsular bag (see below). Remaining viscoelastic was removed using irrigation-aspiration. The anterior chamber was instilled with balanced salt solution and found to be watertight.  0.1cc Cefuroxime was then injected via the paracentesis.  Betadine followed by Durezol drops were applied to the ocular surface which was then covered with a shield. The patient was transferred to the Post Anesthesia Care Unit in satisfactory condition.   PLAN: The patient will be discharged to home and will follow  up tomorrow morning in the eye clinic.  EBL:  None  Complications:  None  Specimens:  None  Findings:  Cataract    Implant Name Type Inv. Item Serial No.  Lot No. LRB No. Used   EYE IMP IOL MARINA PCL TECNIS SYMFONY XR [id number] 24.5 Lens/Eye Implant EYE IMP IOL MARINA PCL TECNIS SYMFONY XR [id number] 24.5 [id number] [employer]   Right 1     CC:  Mervin Moyer MD - [facility]

## 2019-01-08 ENCOUNTER — RECORDS - HEALTHEAST (OUTPATIENT)
Dept: ADMINISTRATIVE | Facility: OTHER | Age: 69
End: 2019-01-08

## 2023-05-12 NOTE — ANESTHESIA PREPROCEDURE EVALUATION
Anesthesia Evaluation     . Pt has had prior anesthetic.     No history of anesthetic complications          ROS/MED HX    ENT/Pulmonary:      (-) sleep apnea   Neurologic:       Cardiovascular:         METS/Exercise Tolerance:     Hematologic:         Musculoskeletal:         GI/Hepatic:         Renal/Genitourinary:         Endo:         Psychiatric:         Infectious Disease:         Malignancy:         Other:                     Physical Exam  Normal systems: dental    Airway   Mallampati: II  TM distance: >3 FB  Neck ROM: full    Dental     Cardiovascular       Pulmonary    breath sounds clear to auscultation                    Anesthesia Plan      History & Physical Review  History and physical reviewed and following examination; no interval change.    ASA Status:  1 .        Plan for MAC with Intravenous induction.     Patient very slow to wake up, did very well during last eye procedure, please go light on meds per patient request      Postoperative Care  Postoperative pain management:  IV analgesics.      Consents  Anesthetic plan, risks, benefits and alternatives discussed with:  Patient..                          .   [Initial Consultation] : an initial consultation for [Immune Evaluation] : immune evaluation [FreeTextEntry2] : recurrent Staph A infections

## (undated) DEVICE — GLOVE PROTEXIS MICRO 7.0  2D73PM70

## (undated) DEVICE — EYE SOL BSS 500ML

## (undated) DEVICE — EYE PACK CUSTOM ANTERIOR 30DEG TIP CENTURION PPK6682-04

## (undated) DEVICE — LINEN TOWEL PACK X5 5464

## (undated) DEVICE — GLOVE PROTEXIS MICRO 6.5  2D73PM65

## (undated) DEVICE — EYE SHIELD PLASTIC

## (undated) DEVICE — EYE PACK BVI READYPAK KIT #3

## (undated) DEVICE — GLOVE PROTEXIS W/NEU-THERA 7.5  2D73TE75

## (undated) DEVICE — EYE KNIFE SLIT XSTAR VISITEC 2.5MM 45DEG BEVEL UP 373725

## (undated) DEVICE — PACK CATARACT CUSTOM SO DALE SEY32CTFCX

## (undated) DEVICE — PEN MARKING SKIN FINE 31145942

## (undated) DEVICE — GLOVE PROTEXIS MICRO 6.0  2D73PM60

## (undated) RX ORDER — LIDOCAINE HYDROCHLORIDE 10 MG/ML
INJECTION, SOLUTION EPIDURAL; INFILTRATION; INTRACAUDAL; PERINEURAL
Status: DISPENSED
Start: 2017-12-11

## (undated) RX ORDER — LIDOCAINE HYDROCHLORIDE 10 MG/ML
INJECTION, SOLUTION EPIDURAL; INFILTRATION; INTRACAUDAL; PERINEURAL
Status: DISPENSED
Start: 2017-11-20